# Patient Record
Sex: MALE | Race: OTHER | Employment: FULL TIME | URBAN - METROPOLITAN AREA
[De-identification: names, ages, dates, MRNs, and addresses within clinical notes are randomized per-mention and may not be internally consistent; named-entity substitution may affect disease eponyms.]

---

## 2018-01-05 PROBLEM — L80 VITILIGO: Status: ACTIVE | Noted: 2018-01-05

## 2018-01-05 PROCEDURE — 86200 CCP ANTIBODY: CPT | Performed by: INTERNAL MEDICINE

## 2018-01-11 PROBLEM — E78.00 PURE HYPERCHOLESTEROLEMIA: Status: ACTIVE | Noted: 2018-01-11

## 2018-01-11 PROBLEM — E55.9 VITAMIN D DEFICIENCY: Status: ACTIVE | Noted: 2018-01-11

## 2020-06-30 ENCOUNTER — HOSPITAL ENCOUNTER (EMERGENCY)
Facility: HOSPITAL | Age: 35
Discharge: HOME | End: 2020-06-30
Attending: EMERGENCY MEDICINE
Payer: COMMERCIAL

## 2020-06-30 VITALS
OXYGEN SATURATION: 99 % | HEIGHT: 66 IN | TEMPERATURE: 98 F | RESPIRATION RATE: 16 BRPM | HEART RATE: 90 BPM | SYSTOLIC BLOOD PRESSURE: 134 MMHG | DIASTOLIC BLOOD PRESSURE: 91 MMHG | WEIGHT: 190 LBS | BODY MASS INDEX: 30.53 KG/M2

## 2020-06-30 DIAGNOSIS — T16.2XXA FOREIGN BODY OF LEFT EAR, INITIAL ENCOUNTER: Primary | ICD-10-CM

## 2020-06-30 PROCEDURE — 99282 EMERGENCY DEPT VISIT SF MDM: CPT

## 2020-06-30 NOTE — ED PROVIDER NOTES
Patient Seen in: [facility] Emergency Department      History   Patient presents with:  Ear Problem Pain    Stated Complaint: FB IN EAR    HPI    This is a pleasant 39-year-old male presenting to the emergency department for ear complaint.   Patient home              Disposition and Plan     Clinical Impression:  Foreign body of left ear, initial encounter  (primary encounter diagnosis)    Disposition:  Discharge  6/30/2020  3:11 am    Follow-up:  No follow-up provider specified.         Medications Pr

## 2020-06-30 NOTE — ED INITIAL ASSESSMENT (HPI)
Pt here with possible bug in L ear, pt c/o feeling bug moving around in ear. Pt attempted to flush out the ear but was unsuccessful.